# Patient Record
(demographics unavailable — no encounter records)

---

## 2025-07-08 NOTE — PHYSICAL EXAM
[Alert] : alert [Oriented x 3] : ~L oriented x 3 [Well Nourished] : well nourished [Conjunctiva Non-injected] : conjunctiva non-injected [No Visual Lymphadenopathy] : no visual  lymphadenopathy [No Clubbing] : no clubbing [No Edema] : no edema [No Bromhidrosis] : no bromhidrosis [No Chromhidrosis] : no chromhidrosis [Full Body Skin Exam Performed] : performed [FreeTextEntry3] : stuck on waxy plaques of the l cheek well healed scar of the L nasal sidewall  many tan patches of trunk and extremities  Fairly uniform and regular brown macules and papules on the trunk and extremities linear scar on L nasal sidewall gritty papules on forehead

## 2025-07-08 NOTE — ASSESSMENT
[FreeTextEntry1] : #History of BCC x 2  - No evidence of recurrence  - BCC s/p Mohs w/ advancement flap L nasal sidewall (Nov 2022)  - Believes first was on her back many years ago   #AKs, forehead - The risks/benefits/alternatives of cryo-destruction was explained to the patient which, include but are not limited to redness, swelling, pain, blistering, scar, discoloration of skin, and recurrence. The patient expressed understanding of these risks and agreed to the procedure. # 3 lesions treated with 2 cycle of LN2. The procedure was well tolerated, without complication. We have discussed related skin care.  #acne, comedonal and #seb hyp chronic, flaring - START tretinoin .025% cream qHS.  - Pt instructed on how to apply topical tretinoin (pea sized amount to entire face). Advised on the potential side effects of topical retinoids including redness, irritation and peeling of the skin, and how using an oil-free moisturizer after application or escalating up the dosing frequency to nightly (i.e. starting with every other night) may help mitigate these side effects. Also explained that a treatment duration of 6 weeks is typically required to achieve significant results.  #ISK, left cheek - The risks/benefits/alternatives of cryo-destruction was explained to the patient which, include but are not limited to redness, swelling, pain, blistering, scar, discoloration of skin, and recurrence. The patient expressed understanding of these risks and agreed to the procedure. # 1 lesions treated with 2 cycle of LN2. The procedure was well tolerated, without complication. We have discussed related skin care.  #Lentigines  #Nevi #SK #Screening exam for skin cancer - benign findings as above  - TBSE performed today - Advised sun protection.  Recommended OTC sunscreen products, including SPF30+ with broadband UV protection as well as proper use.  Discussed OTC sun protective clothing - Counseled patient to monitor for changes, including ABCDEs of mole monitoring - Discussed self-skin exams - rtc q 6 mo for repeat skin exam or sooner if new/concerning lesion